# Patient Record
Sex: MALE | Race: WHITE | NOT HISPANIC OR LATINO | URBAN - METROPOLITAN AREA
[De-identification: names, ages, dates, MRNs, and addresses within clinical notes are randomized per-mention and may not be internally consistent; named-entity substitution may affect disease eponyms.]

---

## 2024-02-12 ENCOUNTER — APPOINTMENT (OUTPATIENT)
Dept: RADIOLOGY | Facility: CLINIC | Age: 59
End: 2024-02-12
Payer: COMMERCIAL

## 2024-04-13 ENCOUNTER — HOSPITAL ENCOUNTER (OUTPATIENT)
Dept: RADIOLOGY | Facility: EXTERNAL LOCATION | Age: 59
Discharge: HOME | End: 2024-04-13

## 2024-04-13 DIAGNOSIS — M25.512 LEFT SHOULDER PAIN, UNSPECIFIED CHRONICITY: ICD-10-CM

## 2024-04-16 ENCOUNTER — OFFICE VISIT (OUTPATIENT)
Dept: ORTHOPEDIC SURGERY | Facility: CLINIC | Age: 59
End: 2024-04-16
Payer: COMMERCIAL

## 2024-04-16 VITALS — BODY MASS INDEX: 31.78 KG/M2 | HEIGHT: 70 IN | WEIGHT: 222 LBS

## 2024-04-16 DIAGNOSIS — S46.012A TRAUMATIC TEAR OF LEFT ROTATOR CUFF, UNSPECIFIED TEAR EXTENT, INITIAL ENCOUNTER: Primary | ICD-10-CM

## 2024-04-16 PROCEDURE — 99344 HOME/RES VST NEW MOD MDM 60: CPT | Performed by: EMERGENCY MEDICINE

## 2024-04-16 RX ORDER — METOPROLOL SUCCINATE 50 MG/1
TABLET, EXTENDED RELEASE ORAL
COMMUNITY
Start: 2024-01-29

## 2024-04-16 RX ORDER — LEVOTHYROXINE SODIUM 125 UG/1
TABLET ORAL
COMMUNITY
Start: 2024-02-27

## 2024-04-16 RX ORDER — MELOXICAM 15 MG/1
15 TABLET ORAL DAILY
Qty: 30 TABLET | Refills: 0 | Status: SHIPPED | OUTPATIENT
Start: 2024-04-16

## 2024-04-16 NOTE — LETTER
April 19, 2024     Patient: Andrei Felix   YOB: 1965   Date of Visit: 4/16/2024       To Whom It May Concern:    Andrei Felix injured his left shoulder on 4/12/24 and was seen and evaluated by me on 4/16/24. He should be excused from work from 4/12-4/19, but moving forward he needs to establish with another provider due to insurance issues. The MRI that we ordered on 4/16 has been denied. In order to be held from work or cleared to go back, he therefore needs to find a provider/MRI within his coverage plan.     If you have any questions or concerns, please don't hesitate to call.         Sincerely,        Ricco Marino MD    CC: No Recipients

## 2024-04-16 NOTE — PROGRESS NOTES
Chief Complaint: Pain of the Left Shoulder ([facility]: DOI 4/12/24 -.  patient states he reached up to grab his hat that was blowing off his head and as soon as went to grab it he got an instant sharp shooting pain. He has limited ROM, tried Tylenol, and something like Reyes Massey.The urgent care prescribed a muscle relaxer all with minimal help./Xr 4/13/24)  History of Present Illness:  Encounter date: 04/16/2024  Andrei Felix is a 58 y.o. male who presents today for evaluation of left shoulder pain.    He presents today for 4 days of left anterior shoulder pain.  He was outside mowing when he has had off and he went to grab it.  He was on the clock at work when this occurred.  He went to urgent care that day and had an x-ray which was negative for fracture but did show some AC joint arthritis.  He was also prescribed Flexeril which is helping him sleep.  His pain is worse with any motion.  Denies numbness, tingling, weakness.    All other systems have been reviewed and are negative for complaint.    Problem List  There are no problems to display for this patient.      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     History of appendectomy    Personal history of other diseases of the respiratory system 06/30/2020    History of deviated nasal septum       Past Surgical History  Past Surgical History:   Procedure Laterality Date    OTHER SURGICAL HISTORY  06/30/2020    Appendectomy    OTHER SURGICAL HISTORY  06/30/2020    Nasal septoplasty    OTHER SURGICAL HISTORY  06/30/2020    Tonsillectomy       Social History  Social History     Socioeconomic History    Marital status:      Spouse name: Not on file    Number of children: Not on file    Years of education: Not on file    Highest education level: Not on file   Occupational History    Not on file   Tobacco Use    Smoking status: Every Day     Types: Cigarettes     Smokeless tobacco: Not on file   Substance and Sexual Activity    Alcohol use: Yes    Drug use: Never    Sexual activity: Not on file   Other Topics Concern    Not on file   Social History Narrative    Not on file     Social Determinants of Health     Financial Resource Strain: Not on file   Food Insecurity: Not on file   Transportation Needs: Not on file   Physical Activity: Not on file   Stress: Not on file   Social Connections: Not on file   Intimate Partner Violence: Not on file   Housing Stability: Not on file       Allergies  Allergies   Allergen Reactions    Griseofulvin Ultramicrosize Rash       Medications  Current Outpatient Medications   Medication Sig Dispense Refill    metoprolol succinate XL (Toprol-XL) 50 mg 24 hr tablet       Synthroid 125 mcg tablet       meloxicam (Mobic) 15 mg tablet Take 1 tablet (15 mg) by mouth once daily. 30 tablet 0     No current facility-administered medications for this visit.       Physical Exam:  General exam-  GENERAL:  Awake, alert, and oriented, no apparent distress, pleasant, and cooperative  PSYC: Mood is euthymic, affect is congruent  EAR, NOSE, THROAT:  Normocephalic, atraumatic, moist membranes, anicteric sclera  LUNG: Nonlabored breathing  HEART: No clubbing or cyanosis  SKIN: No increased erythema, warmth, rashes, or concerning skin lesions  NEURO: Sensation is intact in the bilateral upper and lower extremities. Strength is grossly 5 out of 5 throughout the bilateral upper and lower extremities, unless noted below.  GAIT: Non-antalgic  MUSCULOSKELETAL:  The left shoulder is without obvious signs of acute bony deformity, swelling, erythema or ecchymosis. There is no tenderness along the joint line. There is no tenderness at the AC joint. There is no tenderness at the SC joint. Active range of motion is significantly reduced in the left shoulder with forward flexion to 45 degrees, abduction to 45 degrees, right shoulder range of motion is full and pain-free.   Passive range of motion of the left shoulder is limited in abduction to 90 degrees and painful, the right shoulder is full and pain-free. Impingement signs are positive with De La Rosa and crossover. Instability tests are negative with anterior and posterior drawer, sulcus, and apprehension with relocation test. Rotator cuff strength is weak with internal rotation, external rotation, abduction and painful. The neck and opposite shoulder are otherwise normal and stable.    Imaging:  Radiographs/images of the left shoulder obtained today in the office were reviewed and revealed AC joint osteoarthritis otherwise no fractures.  The studies were reviewed with Dr. Connolly personally in the office today.    Problem List Items Addressed This Visit    None  Visit Diagnoses         Codes    Traumatic tear of left rotator cuff, unspecified tear extent, initial encounter    -  Primary S46.012A    Relevant Medications    meloxicam (Mobic) 15 mg tablet    Other Relevant Orders    MR shoulder left wo IV contrast          Patient Discussion/Summary  We reviewed the exam and x-ray findings and discussed the conservative and surgical treatment options. We agreed that there was concern for a rotator cuff tear especially with his abrupt movement and onset of pain.  At this time we will get an MRI to rule out traumatic rotator cuff tear.  He was also prescribed meloxicam 15 mg daily for the pain.  Once the MRI is complete, we will determine next steps in his treatment.    Consulting Physician:  TRICIA yT DO  Primary Care Sports Medicine Fellow      I saw and evaluated the patient. I personally obtained the key and critical portions of the history and physical exam or was physically present for key and critical portions performed by the resident/fellow/THERESA. I reviewed the resident/fellow/THERESA's documentation and discussed the patient with the resident/fellow/THERESA. I agree with the resident/fellow/THERESA's medical  decision making as documented in the note.    ** Please excuse any errors in grammar or translation related to this dictation. Voice recognition software was utilized to prepare this document. **       Ricco Marino MD  [facility]

## 2024-04-18 ENCOUNTER — TELEPHONE (OUTPATIENT)
Dept: ORTHOPEDIC SURGERY | Facility: CLINIC | Age: 59
End: 2024-04-18
Payer: COMMERCIAL

## 2024-04-18 NOTE — TELEPHONE ENCOUNTER
"Patient call to confirm that his [facility] claim was denied, so he cancelled the MRI - Patient was asking for a note to stay off work till he gets his shoulder figured out but he can no longer see Dr. Perdomo cause we don't take his normal insurance \"per Patient\"   [facility] Team Care   Andrei stated he's just going to show up at the office tomorrow cause he has paper work to be filled out. I advised him that it could take up to 48-72 hours to fill out paper work, and he seemed a little agitated....Told Andrei to start look ng for another Provider that takes his insurance and we would be more than happy to tranfer what records we have on him   "

## 2024-04-19 ENCOUNTER — APPOINTMENT (OUTPATIENT)
Dept: ORTHOPEDIC SURGERY | Facility: CLINIC | Age: 59
End: 2024-04-19
Payer: COMMERCIAL

## 2025-09-03 ENCOUNTER — CLINICAL SUPPORT (OUTPATIENT)
Dept: AUDIOLOGY | Facility: CLINIC | Age: 60
End: 2025-09-03
Payer: COMMERCIAL

## 2025-09-03 ENCOUNTER — OFFICE VISIT (OUTPATIENT)
Dept: OTOLARYNGOLOGY | Facility: CLINIC | Age: 60
End: 2025-09-03
Payer: COMMERCIAL

## 2025-09-03 VITALS — HEIGHT: 70 IN | BODY MASS INDEX: 31.78 KG/M2 | WEIGHT: 222 LBS

## 2025-09-03 DIAGNOSIS — H61.23 BILATERAL IMPACTED CERUMEN: ICD-10-CM

## 2025-09-03 DIAGNOSIS — H90.3 SENSORINEURAL HEARING LOSS (SNHL) OF BOTH EARS: Primary | ICD-10-CM

## 2025-09-03 DIAGNOSIS — H93.13 TINNITUS OF BOTH EARS: ICD-10-CM

## 2025-09-03 DIAGNOSIS — H61.893 DRYNESS OF BOTH EAR CANALS: ICD-10-CM

## 2025-09-03 DIAGNOSIS — H93.8X2 PRESSURE SENSATION IN LEFT EAR: ICD-10-CM

## 2025-09-03 PROCEDURE — 99204 OFFICE O/P NEW MOD 45 MIN: CPT | Performed by: OTOLARYNGOLOGY

## 2025-09-03 PROCEDURE — 92557 COMPREHENSIVE HEARING TEST: CPT

## 2025-09-03 PROCEDURE — 3008F BODY MASS INDEX DOCD: CPT | Performed by: OTOLARYNGOLOGY

## 2025-09-03 PROCEDURE — 69210 REMOVE IMPACTED EAR WAX UNI: CPT | Performed by: OTOLARYNGOLOGY

## 2025-09-03 PROCEDURE — 92567 TYMPANOMETRY: CPT

## 2026-01-05 ENCOUNTER — APPOINTMENT (OUTPATIENT)
Dept: OTOLARYNGOLOGY | Facility: CLINIC | Age: 61
End: 2026-01-05
Payer: COMMERCIAL